# Patient Record
Sex: MALE | Race: OTHER | NOT HISPANIC OR LATINO
[De-identification: names, ages, dates, MRNs, and addresses within clinical notes are randomized per-mention and may not be internally consistent; named-entity substitution may affect disease eponyms.]

---

## 2022-05-10 ENCOUNTER — NON-APPOINTMENT (OUTPATIENT)
Age: 52
End: 2022-05-10

## 2022-05-11 ENCOUNTER — APPOINTMENT (OUTPATIENT)
Dept: PULMONOLOGY | Facility: CLINIC | Age: 52
End: 2022-05-11
Payer: COMMERCIAL

## 2022-05-11 VITALS
HEART RATE: 62 BPM | SYSTOLIC BLOOD PRESSURE: 125 MMHG | WEIGHT: 175 LBS | OXYGEN SATURATION: 97 % | DIASTOLIC BLOOD PRESSURE: 85 MMHG | HEIGHT: 69.5 IN | BODY MASS INDEX: 25.34 KG/M2 | TEMPERATURE: 97.8 F

## 2022-05-11 DIAGNOSIS — Z78.9 OTHER SPECIFIED HEALTH STATUS: ICD-10-CM

## 2022-05-11 DIAGNOSIS — R91.8 OTHER NONSPECIFIC ABNORMAL FINDING OF LUNG FIELD: ICD-10-CM

## 2022-05-11 PROBLEM — Z00.00 ENCOUNTER FOR PREVENTIVE HEALTH EXAMINATION: Status: ACTIVE | Noted: 2022-05-11

## 2022-05-11 PROCEDURE — 99204 OFFICE O/P NEW MOD 45 MIN: CPT

## 2022-05-11 NOTE — HISTORY OF PRESENT ILLNESS
[Never] : never [TextBox_4] : He had palpitation and he went for cardiac work-up the cardiac work-up showed nodules.  He had a CT scan and followed up with that.  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  He has no symptoms at all

## 2022-05-11 NOTE — ASSESSMENT
[FreeTextEntry1] : I discussed the case in details with the patient.  Reviewed the PET scan and the CT scan of the chest.  Patient is a low risk for lung cancer no exposure to secondhand smoking with for 10 years.  He might have been on occupational exposure in the past.  Colonoscopy and PSA were normal.  Patient did not have skin cancer screening.  The nodules I reviewed the CT scan I did not appreciate a 7 mm nodule.  The patient has scattered multiple nodules some of the other and mucous plugging at the end of bronchiectasis.  There are some minor bronchiectasis on the CT scan\par \par Inform the patient that at this point he is a low risk for lung cancer with the CT scan finding.  I recommend repeat the CT scan in 1 year.

## 2022-05-18 ENCOUNTER — TRANSCRIPTION ENCOUNTER (OUTPATIENT)
Age: 52
End: 2022-05-18

## 2023-05-30 ENCOUNTER — APPOINTMENT (OUTPATIENT)
Dept: CT IMAGING | Facility: HOSPITAL | Age: 53
End: 2023-05-30

## 2023-05-30 ENCOUNTER — OUTPATIENT (OUTPATIENT)
Dept: OUTPATIENT SERVICES | Facility: HOSPITAL | Age: 53
LOS: 1 days | End: 2023-05-30
Payer: COMMERCIAL

## 2023-05-30 PROCEDURE — 71250 CT THORAX DX C-: CPT

## 2023-05-30 PROCEDURE — 71250 CT THORAX DX C-: CPT | Mod: 26
